# Patient Record
Sex: FEMALE | Race: WHITE | NOT HISPANIC OR LATINO | URBAN - METROPOLITAN AREA
[De-identification: names, ages, dates, MRNs, and addresses within clinical notes are randomized per-mention and may not be internally consistent; named-entity substitution may affect disease eponyms.]

---

## 2021-06-24 ENCOUNTER — OFFICE VISIT (OUTPATIENT)
Dept: URBAN - METROPOLITAN AREA CLINIC 72 | Facility: CLINIC | Age: 71
End: 2021-06-24
Payer: MEDICARE

## 2021-06-24 ENCOUNTER — WEB ENCOUNTER (OUTPATIENT)
Dept: URBAN - METROPOLITAN AREA CLINIC 72 | Facility: CLINIC | Age: 71
End: 2021-06-24

## 2021-06-24 VITALS
HEART RATE: 94 BPM | WEIGHT: 145.4 LBS | HEIGHT: 65 IN | DIASTOLIC BLOOD PRESSURE: 78 MMHG | SYSTOLIC BLOOD PRESSURE: 148 MMHG | RESPIRATION RATE: 18 BRPM | BODY MASS INDEX: 24.22 KG/M2 | TEMPERATURE: 98.4 F

## 2021-06-24 DIAGNOSIS — R10.13 DYSPEPSIA: ICD-10-CM

## 2021-06-24 DIAGNOSIS — K21.9 GASTROESOPHAGEAL REFLUX DISEASE, UNSPECIFIED WHETHER ESOPHAGITIS PRESENT: ICD-10-CM

## 2021-06-24 DIAGNOSIS — R14.0 ABDOMINAL BLOATING: ICD-10-CM

## 2021-06-24 PROCEDURE — 99204 OFFICE O/P NEW MOD 45 MIN: CPT | Performed by: INTERNAL MEDICINE

## 2021-06-24 RX ORDER — LAMOTRIGINE 150 MG/1
1 TABLET TABLET ORAL TWICE A DAY
Status: ACTIVE | COMMUNITY

## 2021-06-24 RX ORDER — DEXTROAMPHETAMINE SACCHARATE, AMPHETAMINE ASPARTATE, DEXTROAMPHETAMINE SULFATE, AND AMPHETAMINE SULFATE 2.5; 2.5; 2.5; 2.5 MG/1; MG/1; MG/1; MG/1
1 TABLET TABLET ORAL THREE TIMES A DAY
Status: ACTIVE | COMMUNITY

## 2021-06-24 RX ORDER — SERTRALINE HYDROCHLORIDE 100 MG/1
1 TABLET TABLET, FILM COATED ORAL ONCE A DAY
Status: ACTIVE | COMMUNITY

## 2021-06-24 NOTE — HPI-TODAY'S VISIT:
Mrs. Bauman is a 70-year-old female who presents as a new patient for evaluation of bloating and dyspepsia.   She sought care of another gastroenterologist for her symptoms.  Minimal intervention.  She reports that she feels a gurgling sensation when she swalllows.  This is associated with generalized dyspepsia and progressive bloating.  She denies any heartburn but does endorse a sour taste in her mouth.  She endorses weight gain as well.  She knows this can occur regardless of what she eats.  She has occasional nausea associated with it.  No dysphagia.  She has tried ranitidine in the past with minimal improvement.  She believes she has tried Nexium briefly.  Not had a recent upper endoscopy  Labwork available from hospital records from 3/3/2021 show a normal CBC

## 2021-08-02 ENCOUNTER — OFFICE VISIT (OUTPATIENT)
Dept: URBAN - METROPOLITAN AREA MEDICAL CENTER 40 | Facility: MEDICAL CENTER | Age: 71
End: 2021-08-02
Payer: MEDICARE

## 2021-08-02 DIAGNOSIS — R10.13 ABDOMINAL DISCOMFORT, EPIGASTRIC: ICD-10-CM

## 2021-08-02 DIAGNOSIS — K22.10 EROSIVE ESOPHAGITIS: ICD-10-CM

## 2021-08-02 DIAGNOSIS — K29.60 ADENOPAPILLOMATOSIS GASTRICA: ICD-10-CM

## 2021-08-02 PROCEDURE — 43239 EGD BIOPSY SINGLE/MULTIPLE: CPT | Performed by: INTERNAL MEDICINE

## 2021-08-06 ENCOUNTER — TELEPHONE ENCOUNTER (OUTPATIENT)
Dept: URBAN - METROPOLITAN AREA CLINIC 113 | Facility: CLINIC | Age: 71
End: 2021-08-06

## 2021-08-06 RX ORDER — LAMOTRIGINE 150 MG/1
1 TABLET TABLET ORAL TWICE A DAY
Status: ACTIVE | COMMUNITY

## 2021-08-06 RX ORDER — OMEPRAZOLE 40 MG/1
1 CAPSULE 30 MINUTES BEFORE MORNING MEAL CAPSULE, DELAYED RELEASE ORAL ONCE A DAY
Qty: 30 | OUTPATIENT
Start: 2021-08-06

## 2021-08-06 RX ORDER — DEXTROAMPHETAMINE SACCHARATE, AMPHETAMINE ASPARTATE, DEXTROAMPHETAMINE SULFATE, AND AMPHETAMINE SULFATE 2.5; 2.5; 2.5; 2.5 MG/1; MG/1; MG/1; MG/1
1 TABLET TABLET ORAL THREE TIMES A DAY
Status: ACTIVE | COMMUNITY

## 2021-08-06 RX ORDER — SERTRALINE HYDROCHLORIDE 100 MG/1
1 TABLET TABLET, FILM COATED ORAL ONCE A DAY
Status: ACTIVE | COMMUNITY

## 2021-08-20 ENCOUNTER — OFFICE VISIT (OUTPATIENT)
Dept: URBAN - METROPOLITAN AREA CLINIC 72 | Facility: CLINIC | Age: 71
End: 2021-08-20

## 2021-08-27 ENCOUNTER — OFFICE VISIT (OUTPATIENT)
Dept: URBAN - METROPOLITAN AREA CLINIC 72 | Facility: CLINIC | Age: 71
End: 2021-08-27

## 2021-09-09 ENCOUNTER — OFFICE VISIT (OUTPATIENT)
Dept: URBAN - METROPOLITAN AREA CLINIC 72 | Facility: CLINIC | Age: 71
End: 2021-09-09

## 2021-09-09 RX ORDER — DEXTROAMPHETAMINE SACCHARATE, AMPHETAMINE ASPARTATE, DEXTROAMPHETAMINE SULFATE, AND AMPHETAMINE SULFATE 2.5; 2.5; 2.5; 2.5 MG/1; MG/1; MG/1; MG/1
1 TABLET TABLET ORAL THREE TIMES A DAY
Status: ACTIVE | COMMUNITY

## 2021-09-09 RX ORDER — OMEPRAZOLE 40 MG/1
1 CAPSULE 30 MINUTES BEFORE MORNING MEAL CAPSULE, DELAYED RELEASE ORAL ONCE A DAY
Qty: 30 | Status: ACTIVE | COMMUNITY
Start: 2021-08-06

## 2021-09-09 RX ORDER — LAMOTRIGINE 150 MG/1
1 TABLET TABLET ORAL TWICE A DAY
Status: ACTIVE | COMMUNITY

## 2021-09-09 RX ORDER — SERTRALINE HYDROCHLORIDE 100 MG/1
1 TABLET TABLET, FILM COATED ORAL ONCE A DAY
Status: ACTIVE | COMMUNITY

## 2021-09-09 NOTE — HPI-TODAY'S VISIT:
Mrs. Bauman is a pleasant 70-year-old female who returns for follow-up.  She was last seen in our office on 6/24/2021 evaluation of bloating and dyspepsia.  Denies any true heartburn-like symptoms but did occasionally have sour taste in her mouth.  Tried ranitidine with minimal improvement had tried Nexium at some point.  Based on her symptoms we said to proceed with dietary modifications and an endoscopy.  Upper endoscopy performed 8/2/2021 significant for mild antral gastritis and irregular Z line at 38 cm from the incisors.  Biopsy showed inactive gastritis with intestinal metaplasia and chronic erosive esophagitis.

## 2021-09-30 ENCOUNTER — WEB ENCOUNTER (OUTPATIENT)
Dept: URBAN - METROPOLITAN AREA CLINIC 72 | Facility: CLINIC | Age: 71
End: 2021-09-30

## 2021-09-30 ENCOUNTER — OFFICE VISIT (OUTPATIENT)
Dept: URBAN - METROPOLITAN AREA CLINIC 72 | Facility: CLINIC | Age: 71
End: 2021-09-30
Payer: MEDICARE

## 2021-09-30 VITALS
TEMPERATURE: 98.8 F | SYSTOLIC BLOOD PRESSURE: 138 MMHG | WEIGHT: 142 LBS | DIASTOLIC BLOOD PRESSURE: 71 MMHG | HEART RATE: 88 BPM | BODY MASS INDEX: 23.66 KG/M2 | HEIGHT: 65 IN

## 2021-09-30 DIAGNOSIS — K21.9 GASTROESOPHAGEAL REFLUX DISEASE, UNSPECIFIED WHETHER ESOPHAGITIS PRESENT: ICD-10-CM

## 2021-09-30 DIAGNOSIS — R14.0 ABDOMINAL BLOATING: ICD-10-CM

## 2021-09-30 DIAGNOSIS — R10.13 DYSPEPSIA: ICD-10-CM

## 2021-09-30 DIAGNOSIS — K31.89 INTESTINAL METAPLASIA OF GASTRIC MUCOSA: ICD-10-CM

## 2021-09-30 PROCEDURE — 99214 OFFICE O/P EST MOD 30 MIN: CPT | Performed by: INTERNAL MEDICINE

## 2021-09-30 RX ORDER — OMEPRAZOLE 40 MG/1
1 CAPSULE 30 MINUTES BEFORE MORNING MEAL CAPSULE, DELAYED RELEASE ORAL ONCE A DAY
Qty: 30 | Status: ACTIVE | COMMUNITY
Start: 2021-08-06

## 2021-09-30 RX ORDER — DEXTROAMPHETAMINE SACCHARATE, AMPHETAMINE ASPARTATE, DEXTROAMPHETAMINE SULFATE, AND AMPHETAMINE SULFATE 2.5; 2.5; 2.5; 2.5 MG/1; MG/1; MG/1; MG/1
1 TABLET TABLET ORAL THREE TIMES A DAY
Status: ACTIVE | COMMUNITY

## 2021-09-30 RX ORDER — SERTRALINE HYDROCHLORIDE 100 MG/1
1 TABLET TABLET, FILM COATED ORAL ONCE A DAY
Status: ACTIVE | COMMUNITY

## 2021-09-30 RX ORDER — LAMOTRIGINE 150 MG/1
1 TABLET TABLET ORAL TWICE A DAY
Status: ACTIVE | COMMUNITY

## 2021-09-30 RX ORDER — FAMOTIDINE 40 MG/1
1 TABLET AT BEDTIME TABLET, FILM COATED ORAL ONCE A DAY
Qty: 90 | Refills: 1 | OUTPATIENT

## 2021-09-30 NOTE — HPI-TODAY'S VISIT:
Mrs. Bauman is a pleasant 70-year-old female who returns for follow-up.  She was last seen in our office on 6/24/2021 evaluation of bloating and dyspepsia.  Denies any true heartburn-like symptoms but did occasionally have sour taste in her mouth.  Tried ranitidine with minimal improvement had tried Nexium at some point.  Based on her symptoms we said to proceed with dietary modifications and an endoscopy.  Upper endoscopy performed 8/2/2021 significant for mild antral gastritis and irregular Z line at 38 cm from the incisors.  Biopsy showed inactive gastritis with intestinal metaplasia and chronic erosive esophagitis.She tried the omeprazole but felt like it gave her dry mouth therefore she discontinued it.  She is still having burning in her mid esophagus, no further dysphagia.  She is trying to make dietary adjustments to see if she can find what causes more of her symptoms.

## 2021-11-30 ENCOUNTER — OFFICE VISIT (OUTPATIENT)
Dept: URBAN - METROPOLITAN AREA CLINIC 72 | Facility: CLINIC | Age: 71
End: 2021-11-30
Payer: MEDICARE

## 2021-11-30 ENCOUNTER — WEB ENCOUNTER (OUTPATIENT)
Dept: URBAN - METROPOLITAN AREA CLINIC 72 | Facility: CLINIC | Age: 71
End: 2021-11-30

## 2021-11-30 VITALS
BODY MASS INDEX: 23.32 KG/M2 | DIASTOLIC BLOOD PRESSURE: 78 MMHG | SYSTOLIC BLOOD PRESSURE: 141 MMHG | HEART RATE: 95 BPM | RESPIRATION RATE: 18 BRPM | HEIGHT: 65 IN | TEMPERATURE: 98.2 F | WEIGHT: 140 LBS

## 2021-11-30 DIAGNOSIS — R14.0 ABDOMINAL BLOATING: ICD-10-CM

## 2021-11-30 DIAGNOSIS — K21.00 GASTROESOPHAGEAL REFLUX DISEASE WITH ESOPHAGITIS WITHOUT HEMORRHAGE: ICD-10-CM

## 2021-11-30 DIAGNOSIS — K31.89 INTESTINAL METAPLASIA OF GASTRIC MUCOSA: ICD-10-CM

## 2021-11-30 DIAGNOSIS — R10.13 DYSPEPSIA: ICD-10-CM

## 2021-11-30 PROCEDURE — 99214 OFFICE O/P EST MOD 30 MIN: CPT | Performed by: INTERNAL MEDICINE

## 2021-11-30 RX ORDER — OMEPRAZOLE 40 MG/1
1 CAPSULE 30 MINUTES BEFORE MORNING MEAL CAPSULE, DELAYED RELEASE ORAL ONCE A DAY
Qty: 30 | Status: DISCONTINUED | COMMUNITY
Start: 2021-08-06

## 2021-11-30 RX ORDER — DEXTROAMPHETAMINE SACCHARATE, AMPHETAMINE ASPARTATE, DEXTROAMPHETAMINE SULFATE, AND AMPHETAMINE SULFATE 2.5; 2.5; 2.5; 2.5 MG/1; MG/1; MG/1; MG/1
1 TABLET TABLET ORAL THREE TIMES A DAY
Status: ACTIVE | COMMUNITY

## 2021-11-30 RX ORDER — LAMOTRIGINE 150 MG/1
1 TABLET TABLET ORAL TWICE A DAY
Status: ACTIVE | COMMUNITY

## 2021-11-30 RX ORDER — FAMOTIDINE 40 MG/1
1 TABLET AT BEDTIME TABLET, FILM COATED ORAL ONCE A DAY
Qty: 90 | Refills: 1 | Status: DISCONTINUED | COMMUNITY

## 2021-11-30 RX ORDER — SERTRALINE HYDROCHLORIDE 100 MG/1
1 TABLET TABLET, FILM COATED ORAL ONCE A DAY
Status: DISCONTINUED | COMMUNITY

## 2021-11-30 NOTE — HPI-TODAY'S VISIT:
Mrs. Bauman is a pleasant 71-year-old female who returns for follow-up, she was last seen in our office on 9/30/2021.  She has history of bloating and dyspepsia and heartburn-like symptoms underwent an upper endoscopy in August 2021 that showed antral gastritis and irregular Z-line at 38 cm from the incisors.  Biopsy showed inactive gastritis with intestinal metaplasia and Z blind biopsy showed chronic erosive esophagitis.  She was placed on PPI therapy but discontinued it due to dry mouth sensation.  When we saw her she is working on dietary lifestyle changes.  We began Pepcid in place of PPI therapy.  She now returns with a complaint of nausea and vomiting.  She reports ongoing issues with nausea and vomiting. Is actually been seen in the urgent care with minimal improvement, was placed on antiemetic medication which helped briefly but only given a limited supply. She endorses bloating and a sensation of twisting in her stomach as well as a gurgling sensation. She has had a tubal ligation many years ago but no other abdominal surgeries. She has not had any cross-sectional imaging. She reports that stressors seem to worsen things but she notes that continue with generalized abdominal discomfort.

## 2021-12-01 ENCOUNTER — LAB OUTSIDE AN ENCOUNTER (OUTPATIENT)
Dept: URBAN - METROPOLITAN AREA CLINIC 72 | Facility: CLINIC | Age: 71
End: 2021-12-01

## 2021-12-09 ENCOUNTER — TELEPHONE ENCOUNTER (OUTPATIENT)
Dept: URBAN - METROPOLITAN AREA CLINIC 113 | Facility: CLINIC | Age: 71
End: 2021-12-09

## 2021-12-09 PROBLEM — 70342003: Status: ACTIVE | Noted: 2021-12-09

## 2021-12-15 ENCOUNTER — OFFICE VISIT (OUTPATIENT)
Dept: URBAN - METROPOLITAN AREA CLINIC 72 | Facility: CLINIC | Age: 71
End: 2021-12-15

## 2021-12-21 ENCOUNTER — TELEPHONE ENCOUNTER (OUTPATIENT)
Dept: URBAN - METROPOLITAN AREA CLINIC 72 | Facility: CLINIC | Age: 71
End: 2021-12-21

## 2021-12-21 RX ORDER — ONDANSETRON HYDROCHLORIDE 4 MG/1
1 TABLET TABLET, FILM COATED ORAL ONCE A DAY
Qty: 30 | OUTPATIENT
Start: 2021-12-22

## 2022-01-05 ENCOUNTER — OFFICE VISIT (OUTPATIENT)
Dept: URBAN - METROPOLITAN AREA CLINIC 72 | Facility: CLINIC | Age: 72
End: 2022-01-05
Payer: MEDICARE

## 2022-01-05 VITALS
RESPIRATION RATE: 18 BRPM | BODY MASS INDEX: 23.43 KG/M2 | HEART RATE: 97 BPM | WEIGHT: 140.6 LBS | HEIGHT: 65 IN | SYSTOLIC BLOOD PRESSURE: 137 MMHG | DIASTOLIC BLOOD PRESSURE: 72 MMHG | TEMPERATURE: 98 F

## 2022-01-05 DIAGNOSIS — R10.13 DYSPEPSIA: ICD-10-CM

## 2022-01-05 DIAGNOSIS — K80.20 CALCULUS OF GALLBLADDER WITHOUT CHOLECYSTITIS WITHOUT OBSTRUCTION: ICD-10-CM

## 2022-01-05 DIAGNOSIS — K22.10 EROSIVE ESOPHAGITIS: ICD-10-CM

## 2022-01-05 DIAGNOSIS — R14.0 ABDOMINAL BLOATING: ICD-10-CM

## 2022-01-05 DIAGNOSIS — K59.01 SLOW TRANSIT CONSTIPATION: ICD-10-CM

## 2022-01-05 DIAGNOSIS — K31.89 INTESTINAL METAPLASIA OF GASTRIC MUCOSA: ICD-10-CM

## 2022-01-05 PROBLEM — 40719004: Status: ACTIVE | Noted: 2022-01-05

## 2022-01-05 PROCEDURE — 99214 OFFICE O/P EST MOD 30 MIN: CPT | Performed by: INTERNAL MEDICINE

## 2022-01-05 RX ORDER — ONDANSETRON HYDROCHLORIDE 4 MG/1
1 TABLET TABLET, FILM COATED ORAL ONCE A DAY
Qty: 30 | Status: ACTIVE | COMMUNITY
Start: 2021-12-22

## 2022-01-05 RX ORDER — DEXTROAMPHETAMINE SACCHARATE, AMPHETAMINE ASPARTATE, DEXTROAMPHETAMINE SULFATE, AND AMPHETAMINE SULFATE 2.5; 2.5; 2.5; 2.5 MG/1; MG/1; MG/1; MG/1
1 TABLET TABLET ORAL THREE TIMES A DAY
Status: ACTIVE | COMMUNITY

## 2022-01-05 RX ORDER — LAMOTRIGINE 150 MG/1
1 TABLET TABLET ORAL TWICE A DAY
Status: ACTIVE | COMMUNITY

## 2022-01-05 NOTE — HPI-TODAY'S VISIT:
Mrs. Bauman is a pleasant 71-year-old female who returns for follow-up, she was last seen in our office on 11/30/2021.  She has a history of dyspepsia and bloating and heartburn-like symptoms, EGD performed in August 2021 showed antral gastritis and irregular Z-line at 38 cm from the incisors with biopsy showing intestinal metaplasia with inactive gastritis.  Chronic erosive esophagitis was found on esophageal biopsies.  She was placed on PPI therapy but ultimately discontinued medication secondary to dry mouth sensation.  She started Pepcid intermittently.  She is work on dietary lifestyle modifications. Her last saw her she complained of nausea vomiting, will see with an urgent care with minimal improvement reported a pulling and tugging sensation in her abdomen.  Given her symptoms a CT scan was performed on 12/8/2021 with IV contrast showing mild hepatic steatosis and possible gallstone sludge, gastritis/duodenitis and a small hiatal hernia, aortoiliac vascular calcifications and mild chronic constipation as well as L5-S1 spondylolithiasis.  Due to her right-sided pain we decided to proceed with ultrasound that showed possible sludge within the gallbladder otherwise unremarkable a mild increase echotexture of the liver suggestive of steatosis.

## 2022-05-05 ENCOUNTER — OFFICE VISIT (OUTPATIENT)
Dept: URBAN - METROPOLITAN AREA CLINIC 72 | Facility: CLINIC | Age: 72
End: 2022-05-05
Payer: MEDICARE

## 2022-05-05 VITALS
RESPIRATION RATE: 16 BRPM | DIASTOLIC BLOOD PRESSURE: 74 MMHG | WEIGHT: 137.6 LBS | TEMPERATURE: 98.5 F | SYSTOLIC BLOOD PRESSURE: 127 MMHG | HEART RATE: 86 BPM | BODY MASS INDEX: 22.92 KG/M2 | HEIGHT: 65 IN

## 2022-05-05 DIAGNOSIS — K80.20 CALCULUS OF GALLBLADDER WITHOUT CHOLECYSTITIS WITHOUT OBSTRUCTION: ICD-10-CM

## 2022-05-05 DIAGNOSIS — R10.13 DYSPEPSIA: ICD-10-CM

## 2022-05-05 DIAGNOSIS — K31.89 INTESTINAL METAPLASIA OF GASTRIC MUCOSA: ICD-10-CM

## 2022-05-05 DIAGNOSIS — K22.10 EROSIVE ESOPHAGITIS: ICD-10-CM

## 2022-05-05 DIAGNOSIS — K59.01 SLOW TRANSIT CONSTIPATION: ICD-10-CM

## 2022-05-05 DIAGNOSIS — R14.0 ABDOMINAL BLOATING: ICD-10-CM

## 2022-05-05 PROBLEM — 35298007: Status: ACTIVE | Noted: 2022-01-05

## 2022-05-05 PROBLEM — 162031009: Status: ACTIVE | Noted: 2021-12-01

## 2022-05-05 PROBLEM — 72519002: Status: ACTIVE | Noted: 2022-01-21

## 2022-05-05 PROBLEM — 116289008: Status: ACTIVE | Noted: 2021-12-01

## 2022-05-05 PROCEDURE — 99214 OFFICE O/P EST MOD 30 MIN: CPT | Performed by: INTERNAL MEDICINE

## 2022-05-05 RX ORDER — ARIPIPRAZOLE 5 MG/1
1 TABLET TABLET ORAL ONCE A DAY
Status: ACTIVE | COMMUNITY

## 2022-05-05 RX ORDER — ONDANSETRON HYDROCHLORIDE 4 MG/1
1 TABLET TABLET, FILM COATED ORAL ONCE A DAY
Qty: 30 | Status: DISCONTINUED | COMMUNITY
Start: 2021-12-22

## 2022-05-05 RX ORDER — LAMOTRIGINE 150 MG/1
1 TABLET TABLET ORAL TWICE A DAY
Status: ACTIVE | COMMUNITY

## 2022-05-05 RX ORDER — DEXTROAMPHETAMINE SACCHARATE, AMPHETAMINE ASPARTATE, DEXTROAMPHETAMINE SULFATE, AND AMPHETAMINE SULFATE 2.5; 2.5; 2.5; 2.5 MG/1; MG/1; MG/1; MG/1
1 TABLET TABLET ORAL THREE TIMES A DAY
Status: ACTIVE | COMMUNITY

## 2022-05-05 NOTE — HPI-TODAY'S VISIT:
Mrs. Bauman returns for follow-up.  Recall she is a 71-year-old female last seen in our office on 1/5/2022.  She has history of dyspepsia, bloating and heartburn-like symptoms.  EGD performed in August 2021 showed irregular Z-line and gastritis.  Erosive esophagitis was seen on esophageal biopsies and intestinal metaplasia with an active gastritis on gastric biopsies.  She was advised to start PPI but could not tolerate secondary to dry mouth thus she started Pepcid which was working intermittently for her.  We advised lifestyle modifications.  She did had a complaint of nausea and vomiting as well as a burning and pulling sensation in her abdomen, had a CT scan at the end of December with contrast showed mild hepatic steatosis and biliary sludge with a small hiatal hernia and mild chronic constipation.  She had ongoing right-sided pain thus an ultrasound performed that showed sludge within the gallbladder and steatosis.  We advised lifestyle modifications, repeat EGD in 2 years for gastric mapping, high-fiber diet and exercise and lifestyle modifications for her erosive esophagitis.  In addition we referred her to surgery to evaluate her for possible antireflux and biliary concerns.  She ultimately had HIDA that showed 10% ejection fraction of the gallbladder and she underwent laparoscopic cholecystectomy.   She had her gallbladder out and has been doing remarkably well.  She no longer is having any issues.  She does take acid suppression medication intermittently, notes that it has helped her as well but would like to try to come off that if possible. Like she has no complaints today. no

## 2023-10-24 ENCOUNTER — TELEPHONE ENCOUNTER (OUTPATIENT)
Dept: URBAN - METROPOLITAN AREA CLINIC 72 | Facility: CLINIC | Age: 73
End: 2023-10-24

## 2023-10-30 ENCOUNTER — OFFICE VISIT (OUTPATIENT)
Dept: URBAN - METROPOLITAN AREA CLINIC 72 | Facility: CLINIC | Age: 73
End: 2023-10-30
Payer: MEDICARE

## 2023-10-30 VITALS
TEMPERATURE: 97.1 F | BODY MASS INDEX: 23.79 KG/M2 | HEIGHT: 65 IN | WEIGHT: 142.8 LBS | DIASTOLIC BLOOD PRESSURE: 57 MMHG | SYSTOLIC BLOOD PRESSURE: 109 MMHG | HEART RATE: 96 BPM

## 2023-10-30 DIAGNOSIS — R11.0 NAUSEA: ICD-10-CM

## 2023-10-30 DIAGNOSIS — K31.89 INTESTINAL METAPLASIA OF GASTRIC MUCOSA: ICD-10-CM

## 2023-10-30 DIAGNOSIS — R19.7 ACUTE DIARRHEA: ICD-10-CM

## 2023-10-30 PROCEDURE — 99214 OFFICE O/P EST MOD 30 MIN: CPT | Performed by: NURSE PRACTITIONER

## 2023-10-30 RX ORDER — DEXTROAMPHETAMINE SACCHARATE, AMPHETAMINE ASPARTATE, DEXTROAMPHETAMINE SULFATE, AND AMPHETAMINE SULFATE 2.5; 2.5; 2.5; 2.5 MG/1; MG/1; MG/1; MG/1
1 TABLET TABLET ORAL THREE TIMES A DAY
Status: ACTIVE | COMMUNITY

## 2023-10-30 RX ORDER — LAMOTRIGINE 150 MG/1
1 TABLET TABLET ORAL TWICE A DAY
Status: ACTIVE | COMMUNITY

## 2023-10-30 RX ORDER — CHOLESTYRAMINE 4 G/9G
1 SCOOP POWDER, FOR SUSPENSION ORAL ONCE A DAY
Qty: 120 GRAM | Refills: 1 | OUTPATIENT
Start: 2023-10-30

## 2023-10-30 RX ORDER — VALSARTAN 160 MG/1
TAKE ONE TABLET BY MOUTH ONE TIME DAILY TABLET ORAL
Qty: 90 UNSPECIFIED | Refills: 2 | Status: ACTIVE | COMMUNITY

## 2023-10-30 RX ORDER — ARIPIPRAZOLE 5 MG/1
1 TABLET TABLET ORAL ONCE A DAY
Status: ON HOLD | COMMUNITY

## 2023-10-30 RX ORDER — DICYCLOMINE HYDROCHLORIDE 20 MG/1
1 TABLET TABLET ORAL THREE TIMES A DAY
Qty: 90 | Refills: 1 | OUTPATIENT
Start: 2023-10-30 | End: 2023-12-28

## 2023-10-30 NOTE — HPI-OTHER HISTORIES
Labs: -10/23/2023.  CBC: WBC 11.2, Hgb 10.8, HCT 32.4, RBC 3.38.  CMP: GFR 56 otherwise normal.  UA did not show any significant findings for UTI.  Urine culture showed mixed chris-specimen collection suggested.  Procedures: -EGD performed in August 2021 showed irregular Z-line and gastritis. Erosive esophagitis was seen on esophageal biopsies and intestinal metaplasia with an active gastritis on gastric biopsies. She was advised to start PPI but could not tolerate secondary to dry mouth thus she started Pepcid which was working intermittently for her. We advised lifestyle modifications.   She does not recall when her last colonoscopy was. She thought Dr. Landin performed her last one but I do not have any record of colonoscopy in her system.   Imaging: -CT scan at the end of December 2021 with contrast showed mild hepatic steatosis and biliary sludge with a small hiatal hernia and mild chronic constipation.  -Ultrasound December 2021 performed that showed sludge within the gallbladder and steatosis.-HIDA 2/2022 that showed 10% ejection fraction of the gallbladder

## 2023-10-30 NOTE — HPI-TODAY'S VISIT:
73-year-old female heref for diarrhea and ER follow-up.    Was in the ER at [facility] 10/24/2023 for dehydration with diarrhea for the past few days.  She was provided with IV fluids and discharged home.  Last seen 5/5/2022 for 4-month follow-up for erosive esophagitis, intestinal metaplasia, dyspepsia, abdominal bloating, history of constipation.Due for repeat EGD 2023.  Today she reports diarrhea for 10 days.  Reports recent travel within the [place], but no sick contacts and her spouse ate the same foods and is fine.  Stools are brown liquid.  6 times a day.  Has fecal leakage.  Has abdominal cramping.  Prior to this, one formed BM daily.  No melena, hematochezia, mucous or oily stools.  Weight is up 5 pounds since her last appointment. No fever or chills.

## 2023-11-03 LAB
C DIFFICILE TOXINS A+B, EIA: POSITIVE
CALPROTECTIN, FECAL: 545
CAMPYLOBACTER CULTURE: (no result)
E COLI SHIGA TOXIN EIA: NEGATIVE
GIARDIA LAMBLIA AG, EIA: NEGATIVE
OVA + PARASITE EXAM: (no result)
SALMONELLA/SHIGELLA SCREEN: (no result)
WHITE BLOOD CELLS (WBC), STOOL: (no result)

## 2023-11-05 ENCOUNTER — TELEPHONE ENCOUNTER (OUTPATIENT)
Dept: URBAN - METROPOLITAN AREA CLINIC 72 | Facility: CLINIC | Age: 73
End: 2023-11-05

## 2023-11-05 RX ORDER — SACCHAROMYCES BOULARDII 50 MG
TWICE DAILY CAPSULE ORAL
Qty: 60 | Refills: 1 | OUTPATIENT
Start: 2023-11-07 | End: 2024-01-06

## 2023-11-06 ENCOUNTER — TELEPHONE ENCOUNTER (OUTPATIENT)
Dept: URBAN - METROPOLITAN AREA CLINIC 72 | Facility: CLINIC | Age: 73
End: 2023-11-06

## 2023-11-06 ENCOUNTER — TELEPHONE ENCOUNTER (OUTPATIENT)
Dept: URBAN - METROPOLITAN AREA CLINIC 113 | Facility: CLINIC | Age: 73
End: 2023-11-06

## 2023-11-06 RX ORDER — VANCOMYCIN HYDROCHLORIDE 125 MG/1
1 CAPSULES CAPSULE ORAL
Qty: 40 CAPSULE | Refills: 0 | OUTPATIENT
Start: 2023-11-06 | End: 2023-11-16

## 2023-11-07 ENCOUNTER — TELEPHONE ENCOUNTER (OUTPATIENT)
Dept: URBAN - METROPOLITAN AREA CLINIC 113 | Facility: CLINIC | Age: 73
End: 2023-11-07

## 2023-11-07 RX ORDER — VANCOMYCIN HYDROCHLORIDE 125 MG/1
1 CAPSULES CAPSULE ORAL
Qty: 40 CAPSULE | Refills: 0
Start: 2023-11-06 | End: 2023-11-17

## 2023-11-09 ENCOUNTER — TELEPHONE ENCOUNTER (OUTPATIENT)
Dept: URBAN - METROPOLITAN AREA CLINIC 72 | Facility: CLINIC | Age: 73
End: 2023-11-09

## 2023-11-14 ENCOUNTER — OFFICE VISIT (OUTPATIENT)
Dept: URBAN - METROPOLITAN AREA CLINIC 72 | Facility: CLINIC | Age: 73
End: 2023-11-14
Payer: MEDICARE

## 2023-11-14 ENCOUNTER — LAB OUTSIDE AN ENCOUNTER (OUTPATIENT)
Dept: URBAN - METROPOLITAN AREA CLINIC 72 | Facility: CLINIC | Age: 73
End: 2023-11-14

## 2023-11-14 VITALS
HEIGHT: 65 IN | HEART RATE: 101 BPM | SYSTOLIC BLOOD PRESSURE: 120 MMHG | TEMPERATURE: 97.1 F | DIASTOLIC BLOOD PRESSURE: 62 MMHG | WEIGHT: 139 LBS | BODY MASS INDEX: 23.16 KG/M2

## 2023-11-14 DIAGNOSIS — K31.89 INTESTINAL METAPLASIA OF GASTRIC MUCOSA: ICD-10-CM

## 2023-11-14 DIAGNOSIS — A04.72 CLOSTRIDIUM DIFFICILE DIARRHEA: ICD-10-CM

## 2023-11-14 PROCEDURE — 99214 OFFICE O/P EST MOD 30 MIN: CPT | Performed by: NURSE PRACTITIONER

## 2023-11-14 RX ORDER — VALSARTAN 160 MG/1
TAKE ONE TABLET BY MOUTH ONE TIME DAILY TABLET ORAL
Qty: 90 UNSPECIFIED | Refills: 2 | Status: ACTIVE | COMMUNITY

## 2023-11-14 RX ORDER — ARIPIPRAZOLE 5 MG/1
1 TABLET TABLET ORAL ONCE A DAY
Status: ON HOLD | COMMUNITY

## 2023-11-14 RX ORDER — CHOLESTYRAMINE 4 G/9G
1 SCOOP POWDER, FOR SUSPENSION ORAL ONCE A DAY
Qty: 120 GRAM | Refills: 1 | Status: ACTIVE | COMMUNITY
Start: 2023-10-30

## 2023-11-14 RX ORDER — DICYCLOMINE HYDROCHLORIDE 20 MG/1
1 TABLET TABLET ORAL THREE TIMES A DAY
Qty: 90 | Refills: 1 | Status: ACTIVE | COMMUNITY
Start: 2023-10-30 | End: 2023-12-28

## 2023-11-14 RX ORDER — SACCHAROMYCES BOULARDII 50 MG
TWICE DAILY CAPSULE ORAL
Qty: 60 | Refills: 1 | Status: ON HOLD | COMMUNITY
Start: 2023-11-07 | End: 2024-01-06

## 2023-11-14 RX ORDER — VANCOMYCIN HYDROCHLORIDE 125 MG/1
1 CAPSULES CAPSULE ORAL
Qty: 40 CAPSULE | Refills: 0 | Status: ACTIVE | COMMUNITY
Start: 2023-11-06 | End: 2023-11-17

## 2023-11-14 RX ORDER — DEXTROAMPHETAMINE SACCHARATE, AMPHETAMINE ASPARTATE, DEXTROAMPHETAMINE SULFATE, AND AMPHETAMINE SULFATE 2.5; 2.5; 2.5; 2.5 MG/1; MG/1; MG/1; MG/1
1 TABLET TABLET ORAL THREE TIMES A DAY
Status: ACTIVE | COMMUNITY

## 2023-11-14 RX ORDER — LAMOTRIGINE 150 MG/1
1 TABLET TABLET ORAL TWICE A DAY
Status: ACTIVE | COMMUNITY

## 2023-11-14 NOTE — HPI-TODAY'S VISIT:
73-year-old female here for a 2 week follow-up.    Last seen 10/30/2023 for acute diarrhea, nausea and ER follow at [facility] 10/24/2023 for dehydration with diarrhea for which she was provided with IV fluids and discharged home.  At her last office visit, stool studies ordered started on cholestyramine and dicyclomine.  Due for surveillance EGD-to be ordered at next visit. Zofran prescribecd for nausea.  Her stool studies showed C. difficile was prescribed Dificid but was too expensive was switched to vancomycin.  Currently has 1 formed BM a day. No abdominal pain, melena, hematochezia or mucous.  Is taking the Vancomcin only 3 times a day. Going to [facility] next week for anniversary trip.  She is on the cholestyramine.  Weight is down 4 pounds from her last appointment.

## 2023-11-14 NOTE — HPI-OTHER HISTORIES
Labs: -10/23/2023.  CBC: WBC 11.2, Hgb 10.8, HCT 32.4, RBC 3.38.  CMP: GFR 56 otherwise normal.  UA did not show any significant findings for UTI.  Urine culture showed mixed chris-specimen collection suggested. -Stool studies 11/3/2023.  C. difficile positive.  Ova and parasite negative.  Giardia negative.  Fecal calprotectin elevated 545.  Stool culture negative.  WBC negative.  Procedures: -EGD performed in August 2021 showed irregular Z-line and gastritis. Erosive esophagitis was seen on esophageal biopsies and intestinal metaplasia with an active gastritis on gastric biopsies. She was advised to start PPI but could not tolerate secondary to dry mouth thus she started Pepcid which was working intermittently for her. We advised lifestyle modifications.   She does not recall when her last colonoscopy was. She thought Dr. Landin performed her last one but I do not have any record of colonoscopy in her system.   Imaging: -CT scan at the end of December 2021 with contrast showed mild hepatic steatosis and biliary sludge with a small hiatal hernia and mild chronic constipation.  -Ultrasound December 2021 performed that showed sludge within the gallbladder and steatosis. -HIDA 2/2022 that showed 10% ejection fraction of the gallbladder

## 2024-01-16 ENCOUNTER — OFFICE VISIT (OUTPATIENT)
Dept: URBAN - METROPOLITAN AREA CLINIC 72 | Facility: CLINIC | Age: 74
End: 2024-01-16
Payer: MEDICARE

## 2024-01-16 ENCOUNTER — LAB OUTSIDE AN ENCOUNTER (OUTPATIENT)
Dept: URBAN - METROPOLITAN AREA CLINIC 72 | Facility: CLINIC | Age: 74
End: 2024-01-16

## 2024-01-16 VITALS
HEIGHT: 65 IN | HEART RATE: 103 BPM | SYSTOLIC BLOOD PRESSURE: 117 MMHG | TEMPERATURE: 97.1 F | BODY MASS INDEX: 23.49 KG/M2 | DIASTOLIC BLOOD PRESSURE: 68 MMHG | WEIGHT: 141 LBS

## 2024-01-16 DIAGNOSIS — A04.72 CLOSTRIDIUM DIFFICILE DIARRHEA: ICD-10-CM

## 2024-01-16 DIAGNOSIS — K31.89 INTESTINAL METAPLASIA OF GASTRIC MUCOSA: ICD-10-CM

## 2024-01-16 PROCEDURE — 99214 OFFICE O/P EST MOD 30 MIN: CPT | Performed by: NURSE PRACTITIONER

## 2024-01-16 RX ORDER — SERTRALINE HYDROCHLORIDE 100 MG/1
TABLET, FILM COATED ORAL
Qty: 30 TABLET | Status: ACTIVE | COMMUNITY

## 2024-01-16 RX ORDER — VANCOMYCIN HYDROCHLORIDE 125 MG/1
TAKE ONE CAPSULE BY MOUTH FOUR TIMES A DAY FOR 14 DAYS, THEN TAKE ONE CAPSULE BY MOUTH TWICE A DAY FOR 7 DAYS, THEN TAKE ONE CAPSULE BY MOUT CAPSULE ORAL
Qty: 80 UNSPECIFIED | Refills: 0 | Status: ON HOLD | COMMUNITY

## 2024-01-16 RX ORDER — ARIPIPRAZOLE 5 MG/1
1 TABLET TABLET ORAL ONCE A DAY
Status: ON HOLD | COMMUNITY

## 2024-01-16 RX ORDER — LAMOTRIGINE 150 MG/1
1 TABLET TABLET ORAL TWICE A DAY
Status: ACTIVE | COMMUNITY

## 2024-01-16 RX ORDER — DEXTROAMPHETAMINE SACCHARATE, AMPHETAMINE ASPARTATE, DEXTROAMPHETAMINE SULFATE, AND AMPHETAMINE SULFATE 2.5; 2.5; 2.5; 2.5 MG/1; MG/1; MG/1; MG/1
1 TABLET TABLET ORAL THREE TIMES A DAY
Status: ACTIVE | COMMUNITY

## 2024-01-16 RX ORDER — VALSARTAN 160 MG/1
TAKE ONE TABLET BY MOUTH ONE TIME DAILY TABLET ORAL
Qty: 90 UNSPECIFIED | Refills: 2 | Status: ACTIVE | COMMUNITY

## 2024-01-16 NOTE — HPI-TODAY'S VISIT:
73-year-old female here for a 2 month follow-up.    Last seen 11/14/2023 for follow-up for C. difficile.  Diarrhea had resolved.  Was advised to complete full course of vancomycin.  For history of intestinal metaplasia she was due for surveillance EGD.  Scheduled for 2/22/2024.  Her stool studies showed C. difficile was prescribed Dificid but was too expensive was switched to vancomycin.  She reports bowels are still not back to normal.  She has 2 BM's in the morning, has brown stool that is soft.  In November she saw her PCP and received another round of vancomycin that is tapered and she is still on it.  Was then sent to Dr. Ortiz for a flex sig and was told it was ok.  She reports increased gas, no melena or hematochezia. No abdominal pain.  Weight is stable from last appointment.  She is going to start an antibiotic for UTI on Macrobid. She is on a probiotic. She is not on any antacids currently.  Has been on cholestyramine in the past but is not taking it currently.

## 2024-02-22 ENCOUNTER — EGD (OUTPATIENT)
Dept: URBAN - METROPOLITAN AREA MEDICAL CENTER 40 | Facility: MEDICAL CENTER | Age: 74
End: 2024-02-22

## 2024-04-15 ENCOUNTER — OV EP (OUTPATIENT)
Dept: URBAN - METROPOLITAN AREA CLINIC 72 | Facility: CLINIC | Age: 74
End: 2024-04-15
Payer: MEDICARE

## 2024-04-15 VITALS
HEIGHT: 65 IN | SYSTOLIC BLOOD PRESSURE: 110 MMHG | TEMPERATURE: 97.3 F | WEIGHT: 144.6 LBS | HEART RATE: 99 BPM | BODY MASS INDEX: 24.09 KG/M2 | DIASTOLIC BLOOD PRESSURE: 63 MMHG

## 2024-04-15 DIAGNOSIS — A04.72 CLOSTRIDIUM DIFFICILE DIARRHEA: ICD-10-CM

## 2024-04-15 DIAGNOSIS — R10.13 DYSPEPSIA: ICD-10-CM

## 2024-04-15 DIAGNOSIS — K31.89 INTESTINAL METAPLASIA OF GASTRIC MUCOSA: ICD-10-CM

## 2024-04-15 PROCEDURE — 99214 OFFICE O/P EST MOD 30 MIN: CPT | Performed by: NURSE PRACTITIONER

## 2024-04-15 RX ORDER — SERTRALINE HYDROCHLORIDE 100 MG/1
TABLET, FILM COATED ORAL
Qty: 30 TABLET | Status: ACTIVE | COMMUNITY

## 2024-04-15 RX ORDER — VANCOMYCIN HYDROCHLORIDE 125 MG/1
TAKE ONE CAPSULE BY MOUTH FOUR TIMES A DAY FOR 14 DAYS, THEN TAKE ONE CAPSULE BY MOUTH TWICE A DAY FOR 7 DAYS, THEN TAKE ONE CAPSULE BY MOUT CAPSULE ORAL
Qty: 80 UNSPECIFIED | Refills: 0 | Status: ON HOLD | COMMUNITY

## 2024-04-15 RX ORDER — SODIUM SULFATE, MAGNESIUM SULFATE, AND POTASSIUM CHLORIDE 17.75; 2.7; 2.25 G/1; G/1; G/1
AS DIRECTED TABLET ORAL
Qty: 1 KIT | Refills: 0 | OUTPATIENT
Start: 2024-04-15

## 2024-04-15 RX ORDER — ARIPIPRAZOLE 5 MG/1
1 TABLET TABLET ORAL ONCE A DAY
Status: ON HOLD | COMMUNITY

## 2024-04-15 RX ORDER — LAMOTRIGINE 150 MG/1
1 TABLET TABLET ORAL TWICE A DAY
Status: ACTIVE | COMMUNITY

## 2024-04-15 RX ORDER — VALSARTAN 160 MG/1
TAKE ONE TABLET BY MOUTH ONE TIME DAILY TABLET ORAL
Qty: 90 UNSPECIFIED | Refills: 2 | Status: ACTIVE | COMMUNITY

## 2024-04-15 RX ORDER — DEXTROAMPHETAMINE SACCHARATE, AMPHETAMINE ASPARTATE, DEXTROAMPHETAMINE SULFATE, AND AMPHETAMINE SULFATE 2.5; 2.5; 2.5; 2.5 MG/1; MG/1; MG/1; MG/1
1 TABLET TABLET ORAL THREE TIMES A DAY
Status: ACTIVE | COMMUNITY

## 2024-04-15 NOTE — HPI-TODAY'S VISIT:
73-year-old female here for a 2-month follow-up.    Last seen 1/16/2024 for altered bowel habits with history of C. difficile and gastric intestinal metaplasia. She was advised to complete the full course of vancomycin and continue probiotic after treatment. For her intestinal metaplasia recommend upper endoscopy for further evaluation. She would like a colonoscopy for her altered bowel habits. Due to her recent C. difficile I do not want to schedule the procedure right away and did postpone it until June. Advise Benefiber with meals resume cholestyramine if stools become loose and notify me if persistent diarrhea occurs. Records were requested. EGD/colonoscopy scheduled for 6/17/2024  Today her bowels have improved, but she has a queasy stomach. She attributes it to nerves about going out of town for a trip she is not looking forward to.  Bowel movements are regular, no diarrhea. No melena or hematochezia.   Weight is stable from her last appointment. She is not on any antacids currently and is not interested in them states they don't work for her.  Has been on cholestyramine in the past but is not taking it currently.

## 2024-04-15 NOTE — HPI-OTHER HISTORIES
Labs: -10/23/2023.  CBC: WBC 11.2, Hgb 10.8, HCT 32.4, RBC 3.38.  CMP: GFR 56 otherwise normal.  UA did not show any significant findings for UTI.  Urine culture showed mixed chris-specimen collection suggested. -Stool studies 11/3/2023.  C. difficile positive.  Ova and parasite negative.  Giardia negative.  Fecal calprotectin elevated 545.  Stool culture negative.  WBC negative.  Procedures: -EGD performed in August 2021 showed irregular Z-line and gastritis. Erosive esophagitis was seen on esophageal biopsies and intestinal metaplasia with an active gastritis on gastric biopsies. She was advised to start PPI but could not tolerate secondary to dry mouth thus she started Pepcid which was working intermittently for her. We advised lifestyle modifications. -Flex sig 12/12/2023 was normal. Random colon biopsy normal.   Imaging: -CT scan at the end of December 2021 with contrast showed mild hepatic steatosis and biliary sludge with a small hiatal hernia and mild chronic constipation.  -Ultrasound December 2021 performed that showed sludge within the gallbladder and steatosis. -HIDA 2/2022 that showed 10% ejection fraction of the gallbladder

## 2024-05-20 ENCOUNTER — TELEPHONE ENCOUNTER (OUTPATIENT)
Dept: URBAN - METROPOLITAN AREA CLINIC 72 | Facility: CLINIC | Age: 74
End: 2024-05-20

## 2024-05-21 ENCOUNTER — LAB OUTSIDE AN ENCOUNTER (OUTPATIENT)
Dept: URBAN - METROPOLITAN AREA CLINIC 72 | Facility: CLINIC | Age: 74
End: 2024-05-21

## 2024-06-17 ENCOUNTER — OFFICE VISIT (OUTPATIENT)
Dept: URBAN - METROPOLITAN AREA MEDICAL CENTER 40 | Facility: MEDICAL CENTER | Age: 74
End: 2024-06-17

## 2024-06-17 ENCOUNTER — TELEPHONE ENCOUNTER (OUTPATIENT)
Dept: URBAN - METROPOLITAN AREA CLINIC 113 | Facility: CLINIC | Age: 74
End: 2024-06-17

## 2024-06-17 RX ORDER — LAMOTRIGINE 150 MG/1
1 TABLET TABLET ORAL TWICE A DAY
Status: ACTIVE | COMMUNITY

## 2024-06-17 RX ORDER — SERTRALINE HYDROCHLORIDE 100 MG/1
TABLET, FILM COATED ORAL
Qty: 30 TABLET | Status: ACTIVE | COMMUNITY

## 2024-06-17 RX ORDER — VALSARTAN 160 MG/1
TAKE ONE TABLET BY MOUTH ONE TIME DAILY TABLET ORAL
Qty: 90 UNSPECIFIED | Refills: 2 | Status: ACTIVE | COMMUNITY

## 2024-06-17 RX ORDER — ARIPIPRAZOLE 5 MG/1
1 TABLET TABLET ORAL ONCE A DAY
Status: ON HOLD | COMMUNITY

## 2024-06-17 RX ORDER — DEXTROAMPHETAMINE SACCHARATE, AMPHETAMINE ASPARTATE, DEXTROAMPHETAMINE SULFATE, AND AMPHETAMINE SULFATE 2.5; 2.5; 2.5; 2.5 MG/1; MG/1; MG/1; MG/1
1 TABLET TABLET ORAL THREE TIMES A DAY
Status: ACTIVE | COMMUNITY

## 2024-06-17 RX ORDER — VANCOMYCIN HYDROCHLORIDE 125 MG/1
TAKE ONE CAPSULE BY MOUTH FOUR TIMES A DAY FOR 14 DAYS, THEN TAKE ONE CAPSULE BY MOUTH TWICE A DAY FOR 7 DAYS, THEN TAKE ONE CAPSULE BY MOUT CAPSULE ORAL
Qty: 80 UNSPECIFIED | Refills: 0 | Status: ON HOLD | COMMUNITY

## 2024-06-17 RX ORDER — SODIUM SULFATE, MAGNESIUM SULFATE, AND POTASSIUM CHLORIDE 17.75; 2.7; 2.25 G/1; G/1; G/1
AS DIRECTED TABLET ORAL
Qty: 1 KIT | Refills: 0 | Status: ACTIVE | COMMUNITY
Start: 2024-04-15

## 2024-06-18 ENCOUNTER — LAB OUTSIDE AN ENCOUNTER (OUTPATIENT)
Dept: URBAN - METROPOLITAN AREA CLINIC 113 | Facility: CLINIC | Age: 74
End: 2024-06-18

## 2024-06-18 PROBLEM — 87522002: Status: ACTIVE | Noted: 2024-06-18

## 2024-06-26 ENCOUNTER — OFFICE VISIT (OUTPATIENT)
Dept: URBAN - METROPOLITAN AREA CLINIC 72 | Facility: CLINIC | Age: 74
End: 2024-06-26
Payer: MEDICARE

## 2024-06-26 ENCOUNTER — DASHBOARD ENCOUNTERS (OUTPATIENT)
Age: 74
End: 2024-06-26

## 2024-06-26 VITALS
HEIGHT: 65 IN | TEMPERATURE: 97.7 F | DIASTOLIC BLOOD PRESSURE: 80 MMHG | BODY MASS INDEX: 23.86 KG/M2 | WEIGHT: 143.2 LBS | HEART RATE: 105 BPM | SYSTOLIC BLOOD PRESSURE: 149 MMHG

## 2024-06-26 DIAGNOSIS — R10.13 DYSPEPSIA: ICD-10-CM

## 2024-06-26 DIAGNOSIS — R14.0 ABDOMINAL BLOATING: ICD-10-CM

## 2024-06-26 DIAGNOSIS — K31.89 INTESTINAL METAPLASIA OF GASTRIC MUCOSA: ICD-10-CM

## 2024-06-26 PROCEDURE — 99213 OFFICE O/P EST LOW 20 MIN: CPT | Performed by: INTERNAL MEDICINE

## 2024-06-26 RX ORDER — LAMOTRIGINE 150 MG/1
1 TABLET TABLET ORAL TWICE A DAY
Status: ACTIVE | COMMUNITY

## 2024-06-26 RX ORDER — VANCOMYCIN HYDROCHLORIDE 125 MG/1
TAKE ONE CAPSULE BY MOUTH FOUR TIMES A DAY FOR 14 DAYS, THEN TAKE ONE CAPSULE BY MOUTH TWICE A DAY FOR 7 DAYS, THEN TAKE ONE CAPSULE BY MOUT CAPSULE ORAL
Qty: 80 UNSPECIFIED | Refills: 0 | Status: ON HOLD | COMMUNITY

## 2024-06-26 RX ORDER — SERTRALINE HYDROCHLORIDE 100 MG/1
TABLET, FILM COATED ORAL
Qty: 30 TABLET | Status: ACTIVE | COMMUNITY

## 2024-06-26 RX ORDER — SODIUM SULFATE, MAGNESIUM SULFATE, AND POTASSIUM CHLORIDE 17.75; 2.7; 2.25 G/1; G/1; G/1
AS DIRECTED TABLET ORAL
Qty: 1 KIT | Refills: 0 | Status: ON HOLD | COMMUNITY
Start: 2024-04-15

## 2024-06-26 RX ORDER — DEXTROAMPHETAMINE SACCHARATE, AMPHETAMINE ASPARTATE, DEXTROAMPHETAMINE SULFATE, AND AMPHETAMINE SULFATE 2.5; 2.5; 2.5; 2.5 MG/1; MG/1; MG/1; MG/1
1 TABLET TABLET ORAL THREE TIMES A DAY
Status: ACTIVE | COMMUNITY

## 2024-06-26 RX ORDER — ARIPIPRAZOLE 5 MG/1
1 TABLET TABLET ORAL ONCE A DAY
Status: ON HOLD | COMMUNITY

## 2024-06-26 RX ORDER — VALSARTAN 160 MG/1
TAKE ONE TABLET BY MOUTH ONE TIME DAILY TABLET ORAL
Qty: 90 UNSPECIFIED | Refills: 2 | Status: ACTIVE | COMMUNITY

## 2024-06-26 NOTE — EXAM-PHYSICAL EXAM
General--no acute distress, resting comfortably Eyes--anicteric, no pallor HENT--normocephalic, atraumatic head Neck--no lymphadenopathy, non tender Chest--non labored breathing, equal rise Abdomen--soft, non tender, non distended, no organomegaly Ext: SELWYN, no obvious sores or rashes

## 2024-06-26 NOTE — HPI-TODAY'S VISIT:
Mrs. Bauman returns for follow-up.  Recall she is a 73-year-old last seen in office on 1/16/2024.  She has a history of C. difficile colitis in 2023 which was treated.  In addition she has gastric intestinal metaplasia and anemia.  She underwent an EGD for anemia and dyspepsia on 6/18/2024.  She had retained food in the stomach which limited meaningful evaluation.  Lab work from PCP 6/7/2024 showed WBC of 5.4, hemoglobin 11.9, hematocrit 34.9, MCV 98, normal CMP with the exception of an ALT of 33  She was concerned about the retained food in her stomach that she may have gastroparesis, she reports that she actually feels hungry and does not have issues with feeling full, she denies any regurgitation of food or constipation.

## 2024-07-05 ENCOUNTER — OFFICE VISIT (OUTPATIENT)
Dept: URBAN - METROPOLITAN AREA CLINIC 72 | Facility: CLINIC | Age: 74
End: 2024-07-05

## 2024-07-08 ENCOUNTER — OFFICE VISIT (OUTPATIENT)
Dept: URBAN - METROPOLITAN AREA CLINIC 72 | Facility: CLINIC | Age: 74
End: 2024-07-08

## 2024-07-09 ENCOUNTER — OFFICE VISIT (OUTPATIENT)
Dept: URBAN - METROPOLITAN AREA CLINIC 72 | Facility: CLINIC | Age: 74
End: 2024-07-09

## 2024-08-08 ENCOUNTER — OFFICE VISIT (OUTPATIENT)
Dept: URBAN - METROPOLITAN AREA CLINIC 72 | Facility: CLINIC | Age: 74
End: 2024-08-08
Payer: MEDICARE

## 2024-08-08 VITALS
SYSTOLIC BLOOD PRESSURE: 113 MMHG | WEIGHT: 142 LBS | HEART RATE: 89 BPM | DIASTOLIC BLOOD PRESSURE: 64 MMHG | TEMPERATURE: 97.7 F | HEIGHT: 65 IN | BODY MASS INDEX: 23.66 KG/M2

## 2024-08-08 DIAGNOSIS — R19.7 ACUTE DIARRHEA: ICD-10-CM

## 2024-08-08 PROBLEM — 126081000119101: Status: ACTIVE | Noted: 2024-08-08

## 2024-08-08 PROBLEM — 409966000: Status: ACTIVE | Noted: 2024-08-08

## 2024-08-08 PROCEDURE — 99213 OFFICE O/P EST LOW 20 MIN: CPT

## 2024-08-08 RX ORDER — SODIUM SULFATE, MAGNESIUM SULFATE, AND POTASSIUM CHLORIDE 17.75; 2.7; 2.25 G/1; G/1; G/1
AS DIRECTED TABLET ORAL
Qty: 1 KIT | Refills: 0 | Status: ON HOLD | COMMUNITY
Start: 2024-04-15

## 2024-08-08 RX ORDER — VALSARTAN 160 MG/1
TAKE ONE TABLET BY MOUTH ONE TIME DAILY TABLET ORAL
Qty: 90 UNSPECIFIED | Refills: 2 | Status: ACTIVE | COMMUNITY

## 2024-08-08 RX ORDER — CHOLESTYRAMINE 4 G/9G
1 PACKET MIXED WITH WATER OR NON-CARBONATED DRINK POWDER, FOR SUSPENSION ORAL ONCE A DAY
Qty: 30 | Refills: 2 | OUTPATIENT
Start: 2024-08-08

## 2024-08-08 RX ORDER — LAMOTRIGINE 150 MG/1
1 TABLET TABLET ORAL TWICE A DAY
Status: ACTIVE | COMMUNITY

## 2024-08-08 RX ORDER — VANCOMYCIN HYDROCHLORIDE 125 MG/1
TAKE ONE CAPSULE BY MOUTH FOUR TIMES A DAY FOR 14 DAYS, THEN TAKE ONE CAPSULE BY MOUTH TWICE A DAY FOR 7 DAYS, THEN TAKE ONE CAPSULE BY MOUT CAPSULE ORAL
Qty: 80 UNSPECIFIED | Refills: 0 | Status: ON HOLD | COMMUNITY

## 2024-08-08 RX ORDER — ARIPIPRAZOLE 5 MG/1
1 TABLET TABLET ORAL ONCE A DAY
Status: ON HOLD | COMMUNITY

## 2024-08-08 RX ORDER — DEXTROAMPHETAMINE SACCHARATE, AMPHETAMINE ASPARTATE, DEXTROAMPHETAMINE SULFATE, AND AMPHETAMINE SULFATE 2.5; 2.5; 2.5; 2.5 MG/1; MG/1; MG/1; MG/1
1 TABLET TABLET ORAL THREE TIMES A DAY
Status: ACTIVE | COMMUNITY

## 2024-08-08 RX ORDER — SERTRALINE HYDROCHLORIDE 100 MG/1
TABLET, FILM COATED ORAL
Qty: 30 TABLET | Status: ACTIVE | COMMUNITY

## 2024-08-08 NOTE — EXAM-FUNCTIONAL ASSESSMENT
General--no acute distress, resting comfortably Eyes--anicteric, no pallor HENT--normocephalic, atraumatic head Neck--no lymphadenopathy, symmetric Chest--non labored breathing, equal rise Abdomen--soft, non tender, non distended, no organomegaly Ext: SELWYN, no obvious sores or rashes

## 2024-08-08 NOTE — HPI-TODAY'S VISIT:
Patient is a 73-year-old female seen today for diarrhea and nausea.  Patient was last seen by Dr. Landin on 6/26/2024 after and EGD for anemia and dyspepsia performed on 6/18/2024.  Patient did have retained food in the stomach so evaluation was limited.  Last week patient was on a cruise - she got diarrhea, nausea, cramps, and headache, Went to sick [place], and was diagnosed with gastroeneteritis. She was given loperamide and on zofran. Patient has a history of c diff, and has recently been on antibiotics for tooth abcess.

## 2024-08-14 ENCOUNTER — TELEPHONE ENCOUNTER (OUTPATIENT)
Dept: URBAN - METROPOLITAN AREA CLINIC 72 | Facility: CLINIC | Age: 74
End: 2024-08-14

## 2024-08-14 RX ORDER — FIDAXOMICIN 200 MG/1
1 TABLET TABLET, FILM COATED ORAL TWICE A DAY
Qty: 20 | Refills: 0 | OUTPATIENT
Start: 2024-08-14 | End: 2024-08-24

## 2024-08-15 ENCOUNTER — TELEPHONE ENCOUNTER (OUTPATIENT)
Dept: URBAN - METROPOLITAN AREA CLINIC 72 | Facility: CLINIC | Age: 74
End: 2024-08-15

## 2024-08-15 RX ORDER — VANCOMYCIN HYDROCHLORIDE 250 MG/1
1 CAPSULE 4 TIMES A DAY FOR 14 DAYS, 1 CAPSULE 3 TIMES A DAY FOR 7 DAYS, 1 CAPSULE 2 TIMES A DAY FOR 7 DAYS, 1 CAPSULE ONCE A DAY FOR 30 DAYS CAPSULE ORAL
Qty: 121 CAPSULE | Refills: 1 | OUTPATIENT
Start: 2024-08-16 | End: 2024-12-09

## 2024-09-05 ENCOUNTER — OFFICE VISIT (OUTPATIENT)
Dept: URBAN - METROPOLITAN AREA CLINIC 72 | Facility: CLINIC | Age: 74
End: 2024-09-05
Payer: MEDICARE

## 2024-09-05 VITALS
WEIGHT: 141.6 LBS | BODY MASS INDEX: 23.59 KG/M2 | DIASTOLIC BLOOD PRESSURE: 69 MMHG | HEART RATE: 98 BPM | TEMPERATURE: 97 F | SYSTOLIC BLOOD PRESSURE: 124 MMHG | HEIGHT: 65 IN

## 2024-09-05 DIAGNOSIS — Z86.19 HISTORY OF CLOSTRIDIOIDES DIFFICILE COLITIS: ICD-10-CM

## 2024-09-05 PROCEDURE — 99214 OFFICE O/P EST MOD 30 MIN: CPT

## 2024-09-05 RX ORDER — SERTRALINE HYDROCHLORIDE 100 MG/1
TABLET, FILM COATED ORAL
Qty: 30 TABLET | Status: ACTIVE | COMMUNITY

## 2024-09-05 RX ORDER — VANCOMYCIN HYDROCHLORIDE 250 MG/1
1 CAPSULE 4 TIMES A DAY FOR 14 DAYS, 1 CAPSULE 3 TIMES A DAY FOR 7 DAYS, 1 CAPSULE 2 TIMES A DAY FOR 7 DAYS, 1 CAPSULE ONCE A DAY FOR 30 DAYS CAPSULE ORAL
Qty: 121 CAPSULE | Refills: 1 | Status: ACTIVE | COMMUNITY
Start: 2024-08-16 | End: 2024-12-09

## 2024-09-05 RX ORDER — VALSARTAN 160 MG/1
TAKE ONE TABLET BY MOUTH ONE TIME DAILY TABLET ORAL
Qty: 90 UNSPECIFIED | Refills: 2 | Status: ACTIVE | COMMUNITY

## 2024-09-05 RX ORDER — VANCOMYCIN HYDROCHLORIDE 125 MG/1
TAKE ONE CAPSULE BY MOUTH FOUR TIMES A DAY FOR 14 DAYS, THEN TAKE ONE CAPSULE BY MOUTH TWICE A DAY FOR 7 DAYS, THEN TAKE ONE CAPSULE BY MOUT CAPSULE ORAL
Qty: 80 UNSPECIFIED | Refills: 0 | Status: ON HOLD | COMMUNITY

## 2024-09-05 RX ORDER — SODIUM SULFATE, MAGNESIUM SULFATE, AND POTASSIUM CHLORIDE 17.75; 2.7; 2.25 G/1; G/1; G/1
AS DIRECTED TABLET ORAL
Qty: 1 KIT | Refills: 0 | Status: ON HOLD | COMMUNITY
Start: 2024-04-15

## 2024-09-05 RX ORDER — DEXTROAMPHETAMINE SACCHARATE, AMPHETAMINE ASPARTATE, DEXTROAMPHETAMINE SULFATE, AND AMPHETAMINE SULFATE 2.5; 2.5; 2.5; 2.5 MG/1; MG/1; MG/1; MG/1
1 TABLET TABLET ORAL THREE TIMES A DAY
Status: ACTIVE | COMMUNITY

## 2024-09-05 RX ORDER — CHOLESTYRAMINE 4 G/9G
1 PACKET MIXED WITH WATER OR NON-CARBONATED DRINK POWDER, FOR SUSPENSION ORAL ONCE A DAY
Qty: 30 | Refills: 2 | Status: ACTIVE | COMMUNITY
Start: 2024-08-08

## 2024-09-05 RX ORDER — ARIPIPRAZOLE 5 MG/1
1 TABLET TABLET ORAL ONCE A DAY
Status: ON HOLD | COMMUNITY

## 2024-09-05 RX ORDER — LAMOTRIGINE 150 MG/1
1 TABLET TABLET ORAL TWICE A DAY
Status: ACTIVE | COMMUNITY

## 2024-09-05 NOTE — HPI-TODAY'S VISIT:
Patient is a 73-year-old female last seen in the office on 8/8/2024 for acute diarrhea which tested positive for C. difficile.  Patient has a history of C. difficile.  Dificid was prescribed, but the out-of-pocket co-pay was too expensive for the patient so she was put on a long taper of vancomycin, and maintained on cholestyramine daily.  Patient had a colonoscopy scheduled prior to C. difficile diagnosis, but it is unclear whether or not the patient is still scheduled.  Patient should be scheduled at end of day.  Patient is seen today without complaints of diarrhea, but states that the stool is still not great. The stools are somewhat formed, but differ in diameter, and are still soft and mushy. She is going 2 times daily - incomplete evacuation. She is on probiotic, but stopped cholestyramine. Her stools were more formed with the cholestyramine - will She has completed 2 weeks of 4 pills daily, and on Monday she started 3 pills a day. She is still scheduled for her colonoscopy on 9/19/24. Denies abd pain.

## 2024-09-19 ENCOUNTER — OFFICE VISIT (OUTPATIENT)
Dept: URBAN - METROPOLITAN AREA MEDICAL CENTER 40 | Facility: MEDICAL CENTER | Age: 74
End: 2024-09-19
Payer: MEDICARE

## 2024-09-19 DIAGNOSIS — D50.9 IRON DEFICIENCY ANEMIA, UNSPECIFIED IRON DEFICIENCY ANEMIA TYPE: ICD-10-CM

## 2024-09-19 DIAGNOSIS — K29.60 OTHER GASTRITIS WITHOUT BLEEDING: ICD-10-CM

## 2024-09-19 DIAGNOSIS — K31.A15 GASTRIC INTESTINAL METAPLASIA WITHOUT DYSPLASIA, INVOLVING MULTIPLE SITES: ICD-10-CM

## 2024-09-19 DIAGNOSIS — K22.10 EROSIVE ESOPHAGITIS: ICD-10-CM

## 2024-09-19 DIAGNOSIS — D12.0 ADENOMA OF CECUM: ICD-10-CM

## 2024-09-19 DIAGNOSIS — R19.4 CHANGE IN BOWEL HABIT: ICD-10-CM

## 2024-09-19 PROCEDURE — 43239 EGD BIOPSY SINGLE/MULTIPLE: CPT | Performed by: INTERNAL MEDICINE

## 2024-09-19 PROCEDURE — 45385 COLONOSCOPY W/LESION REMOVAL: CPT | Performed by: INTERNAL MEDICINE

## 2024-09-19 PROCEDURE — 45380 COLONOSCOPY AND BIOPSY: CPT | Performed by: INTERNAL MEDICINE

## 2024-10-02 PROBLEM — 423590009: Status: ACTIVE | Noted: 2024-10-02

## 2024-10-02 PROBLEM — 84568007: Status: ACTIVE | Noted: 2024-10-02

## 2024-10-02 PROBLEM — 428283002: Status: ACTIVE | Noted: 2024-10-02

## 2024-10-03 ENCOUNTER — OFFICE VISIT (OUTPATIENT)
Dept: URBAN - METROPOLITAN AREA CLINIC 72 | Facility: CLINIC | Age: 74
End: 2024-10-03
Payer: MEDICARE

## 2024-10-03 VITALS — HEIGHT: 65 IN | TEMPERATURE: 97.3 F | WEIGHT: 134.6 LBS | HEART RATE: 104 BPM | BODY MASS INDEX: 22.42 KG/M2

## 2024-10-03 DIAGNOSIS — Z86.0101 PERSONAL HISTORY OF ADENOMATOUS AND SERRATED COLON POLYPS: ICD-10-CM

## 2024-10-03 DIAGNOSIS — A04.72 C. DIFFICILE COLITIS: ICD-10-CM

## 2024-10-03 DIAGNOSIS — K29.40 ATROPHIC GASTRITIS, PRESENCE OF BLEEDING UNSPECIFIED: ICD-10-CM

## 2024-10-03 PROCEDURE — 99214 OFFICE O/P EST MOD 30 MIN: CPT

## 2024-10-03 RX ORDER — LAMOTRIGINE 150 MG/1
1 TABLET TABLET ORAL TWICE A DAY
Status: ACTIVE | COMMUNITY

## 2024-10-03 RX ORDER — CHOLESTYRAMINE 4 G/9G
1 PACKET MIXED WITH WATER OR NON-CARBONATED DRINK POWDER, FOR SUSPENSION ORAL ONCE A DAY
Qty: 30 | Refills: 2 | Status: ACTIVE | COMMUNITY
Start: 2024-08-08

## 2024-10-03 RX ORDER — ARIPIPRAZOLE 5 MG/1
1 TABLET TABLET ORAL ONCE A DAY
Status: ON HOLD | COMMUNITY

## 2024-10-03 RX ORDER — SODIUM SULFATE, MAGNESIUM SULFATE, AND POTASSIUM CHLORIDE 17.75; 2.7; 2.25 G/1; G/1; G/1
AS DIRECTED TABLET ORAL
Qty: 1 KIT | Refills: 0 | Status: ON HOLD | COMMUNITY
Start: 2024-04-15

## 2024-10-03 RX ORDER — VALSARTAN 160 MG/1
TAKE ONE TABLET BY MOUTH ONE TIME DAILY TABLET ORAL
Qty: 90 UNSPECIFIED | Refills: 2 | Status: ACTIVE | COMMUNITY

## 2024-10-03 RX ORDER — DEXTROAMPHETAMINE SACCHARATE, AMPHETAMINE ASPARTATE, DEXTROAMPHETAMINE SULFATE, AND AMPHETAMINE SULFATE 2.5; 2.5; 2.5; 2.5 MG/1; MG/1; MG/1; MG/1
1 TABLET TABLET ORAL THREE TIMES A DAY
Status: ACTIVE | COMMUNITY

## 2024-10-03 RX ORDER — LANSOPRAZOLE 30 MG/1
1 CAPSULE CAPSULE, DELAYED RELEASE ORAL ONCE A DAY
Qty: 90 CAPSULE | Refills: 0 | OUTPATIENT
Start: 2024-10-03

## 2024-10-03 RX ORDER — SERTRALINE HYDROCHLORIDE 100 MG/1
TABLET, FILM COATED ORAL
Qty: 30 TABLET | Status: ACTIVE | COMMUNITY

## 2024-10-03 RX ORDER — VANCOMYCIN HYDROCHLORIDE 125 MG/1
1 CAPSULE CAPSULE ORAL ONCE A DAY
Qty: 90 CAPSULE | Refills: 0 | OUTPATIENT
Start: 2024-10-03 | End: 2024-12-31

## 2024-10-03 RX ORDER — VANCOMYCIN HYDROCHLORIDE 125 MG/1
TAKE ONE CAPSULE BY MOUTH FOUR TIMES A DAY FOR 14 DAYS, THEN TAKE ONE CAPSULE BY MOUTH TWICE A DAY FOR 7 DAYS, THEN TAKE ONE CAPSULE BY MOUT CAPSULE ORAL
Qty: 80 UNSPECIFIED | Refills: 0 | Status: ON HOLD | COMMUNITY

## 2024-10-03 RX ORDER — VANCOMYCIN HYDROCHLORIDE 250 MG/1
1 CAPSULE CAPSULE ORAL DAILY
Qty: 58 CAPSULE | Refills: 1 | Status: ACTIVE | COMMUNITY
Start: 2024-08-16 | End: 2024-12-09

## 2024-10-03 NOTE — HPI-TODAY'S VISIT:
Patient is a 74-year-old female complicated history of C. difficile.  Patient should be considered for long-term treatment of think 125 mg daily.  Patient is here today for 2-week post colonoscopy visit.  Patient is on vancomycin 125 mg daily due to c diff histroy. We could try and retest for c diff and prescribe Dificid  - go through [employer] for coverage.   Patient is losing weight with no change in her appetite. She has lost 5 lbs in 3 months. Having 1-2 BM's daily soemtimes formed, but soft with lots of gas.  Wondering if this could be related to atrophic gastritis. Not on PPI - could start one. Drinks wine nightly. Patient has done some research and she is worries she will get stomach cancer - she used to be a drug rep for oncology and has states that she does not want any sort of treatment.   Procedure: EGD-9/19/2024: Duodenum normal.  Stomach normal.  Gastric erythema biopsies taken.  Esophagus normal.  Z-line noted to be at 35 cm from incisors and irregular.  Path: Moderate chronic inactive gastritis with intestinal metaplasia and findings compatible with severe oxyntic atrophy which could indicate autoimmune metaplastic atrophic gastritis.  Serum gastrin levels and serology antiparietal cell and/or antiintrinsic factor antibodies should be drawn.  Colonoscopy-9/19/2024: Perianal digital rectal examinations were normal.  Terminal ileum appeared normal.  5 mm sessile cecal polyp.  No evidence of pseudomembranes.  Random colon biopsies done.  Path: Cecal polyp was a sessile serrated polyp.  Random biopsies negative. Colon recall 5 years.  Due 9/2029.

## 2024-10-10 LAB
ANTIPARIETAL CELL ANTIBODY: 101.9
GASTRIN, SERUM: 3933
INTRINSIC FACTOR BLOCKING: POSITIVE

## 2024-10-16 ENCOUNTER — OFFICE VISIT (OUTPATIENT)
Dept: URBAN - METROPOLITAN AREA CLINIC 72 | Facility: CLINIC | Age: 74
End: 2024-10-16

## 2024-10-23 ENCOUNTER — OFFICE VISIT (OUTPATIENT)
Dept: URBAN - METROPOLITAN AREA CLINIC 72 | Facility: CLINIC | Age: 74
End: 2024-10-23

## 2024-12-10 ENCOUNTER — OFFICE VISIT (OUTPATIENT)
Dept: URBAN - METROPOLITAN AREA CLINIC 72 | Facility: CLINIC | Age: 74
End: 2024-12-10
Payer: MEDICARE

## 2024-12-10 ENCOUNTER — LAB OUTSIDE AN ENCOUNTER (OUTPATIENT)
Dept: URBAN - METROPOLITAN AREA CLINIC 72 | Facility: CLINIC | Age: 74
End: 2024-12-10

## 2024-12-10 VITALS
BODY MASS INDEX: 23.96 KG/M2 | HEIGHT: 65 IN | TEMPERATURE: 97.8 F | SYSTOLIC BLOOD PRESSURE: 142 MMHG | DIASTOLIC BLOOD PRESSURE: 70 MMHG | HEART RATE: 97 BPM | WEIGHT: 143.8 LBS

## 2024-12-10 DIAGNOSIS — R14.0 ABDOMINAL BLOATING: ICD-10-CM

## 2024-12-10 DIAGNOSIS — A04.72 CLOSTRIDIUM DIFFICILE DIARRHEA: ICD-10-CM

## 2024-12-10 DIAGNOSIS — K31.89 INTESTINAL METAPLASIA OF GASTRIC MUCOSA: ICD-10-CM

## 2024-12-10 PROCEDURE — 99214 OFFICE O/P EST MOD 30 MIN: CPT | Performed by: INTERNAL MEDICINE

## 2024-12-10 RX ORDER — LANSOPRAZOLE 30 MG/1
1 CAPSULE CAPSULE, DELAYED RELEASE ORAL ONCE A DAY
Qty: 90 CAPSULE | Refills: 0 | Status: ACTIVE | COMMUNITY
Start: 2024-10-03

## 2024-12-10 RX ORDER — VALSARTAN 160 MG/1
TAKE ONE TABLET BY MOUTH ONE TIME DAILY TABLET ORAL
Qty: 90 UNSPECIFIED | Refills: 2 | Status: ACTIVE | COMMUNITY

## 2024-12-10 RX ORDER — VANCOMYCIN HYDROCHLORIDE 125 MG/1
TAKE ONE CAPSULE BY MOUTH FOUR TIMES A DAY FOR 14 DAYS, THEN TAKE ONE CAPSULE BY MOUTH TWICE A DAY FOR 7 DAYS, THEN TAKE ONE CAPSULE BY MOUT CAPSULE ORAL
Qty: 80 UNSPECIFIED | Refills: 0 | Status: ON HOLD | COMMUNITY

## 2024-12-10 RX ORDER — DEXTROAMPHETAMINE SACCHARATE, AMPHETAMINE ASPARTATE, DEXTROAMPHETAMINE SULFATE, AND AMPHETAMINE SULFATE 2.5; 2.5; 2.5; 2.5 MG/1; MG/1; MG/1; MG/1
1 TABLET TABLET ORAL THREE TIMES A DAY
Status: ACTIVE | COMMUNITY

## 2024-12-10 RX ORDER — ARIPIPRAZOLE 5 MG/1
1 TABLET TABLET ORAL ONCE A DAY
Status: ON HOLD | COMMUNITY

## 2024-12-10 RX ORDER — LAMOTRIGINE 150 MG/1
1 TABLET TABLET ORAL TWICE A DAY
Status: ACTIVE | COMMUNITY

## 2024-12-10 RX ORDER — CHOLESTYRAMINE 4 G/9G
1 PACKET MIXED WITH WATER OR NON-CARBONATED DRINK POWDER, FOR SUSPENSION ORAL ONCE A DAY
Qty: 30 | Refills: 2 | Status: ACTIVE | COMMUNITY
Start: 2024-08-08

## 2024-12-10 RX ORDER — VANCOMYCIN HYDROCHLORIDE 125 MG/1
1 CAPSULE CAPSULE ORAL ONCE A DAY
Qty: 90 CAPSULE | Refills: 0 | Status: ACTIVE | COMMUNITY
Start: 2024-10-03 | End: 2024-12-31

## 2024-12-10 RX ORDER — SODIUM SULFATE, MAGNESIUM SULFATE, AND POTASSIUM CHLORIDE 17.75; 2.7; 2.25 G/1; G/1; G/1
AS DIRECTED TABLET ORAL
Qty: 1 KIT | Refills: 0 | Status: ON HOLD | COMMUNITY
Start: 2024-04-15

## 2024-12-10 RX ORDER — SERTRALINE HYDROCHLORIDE 100 MG/1
TABLET, FILM COATED ORAL
Qty: 30 TABLET | Status: ACTIVE | COMMUNITY

## 2024-12-10 NOTE — HPI-TODAY'S VISIT:
Mrs. Bauman returns for follow-up.  Recall she is a 74-year-old last seen in office on 10/3/2024.  She has history of atrophic gastritis, personal history of colon polyps and C. difficile colitis on vancomycin.  She had a colonoscopy in September that showed metaplastic atrophic gastritis and a colon polyp due for repeat colonoscopy in 5 years.  Lab work including gastrin, antiparietal cell and intrinsic factor labs were ordered she was started on PPI.  She has been on vancomycin 125 mg daily.  There is no evidence of pseudomembranes on her colonoscopy.  8/8/2024 stool BX kit was positive for C. difficile with EGD a positive and C. difficile toxin AB+, fecal calprotectin was 513. 10/4/2024 gastro level 3933, antiparietal cell antibody elevated at 101.9, intrinsic factor antibody positive.  She reports longstanding history of pernicious anemia, has been on B12 injections in the past. Feels well today.  Still having softer stool, and has not returned to normal.  She is on vancomycin daily.

## 2024-12-30 ENCOUNTER — TELEPHONE ENCOUNTER (OUTPATIENT)
Dept: URBAN - METROPOLITAN AREA CLINIC 107 | Facility: CLINIC | Age: 74
End: 2024-12-30

## 2025-01-13 ENCOUNTER — TELEPHONE ENCOUNTER (OUTPATIENT)
Dept: URBAN - METROPOLITAN AREA CLINIC 107 | Facility: CLINIC | Age: 75
End: 2025-01-13

## 2025-01-14 ENCOUNTER — TELEPHONE ENCOUNTER (OUTPATIENT)
Dept: URBAN - METROPOLITAN AREA CLINIC 72 | Facility: CLINIC | Age: 75
End: 2025-01-14

## 2025-01-17 ENCOUNTER — TELEPHONE ENCOUNTER (OUTPATIENT)
Dept: URBAN - METROPOLITAN AREA CLINIC 72 | Facility: CLINIC | Age: 75
End: 2025-01-17

## 2025-01-17 RX ORDER — VANCOMYCIN HYDROCHLORIDE 250 MG/1
1 CAPSULE CAPSULE ORAL
Qty: 109 | Refills: 0 | OUTPATIENT
Start: 2025-01-17

## 2025-01-17 RX ORDER — VANCOMYCIN HYDROCHLORIDE 125 MG/1
1 CAPSULE CAPSULE ORAL
Qty: 70 | Refills: 0 | OUTPATIENT
Start: 2025-01-17

## 2025-01-29 PROBLEM — 789685005: Status: ACTIVE | Noted: 2025-01-29

## 2025-01-30 ENCOUNTER — OFFICE VISIT (OUTPATIENT)
Dept: URBAN - METROPOLITAN AREA CLINIC 72 | Facility: CLINIC | Age: 75
End: 2025-01-30
Payer: MEDICARE

## 2025-01-30 VITALS
DIASTOLIC BLOOD PRESSURE: 70 MMHG | WEIGHT: 142.2 LBS | BODY MASS INDEX: 23.69 KG/M2 | HEART RATE: 91 BPM | SYSTOLIC BLOOD PRESSURE: 124 MMHG | HEIGHT: 65 IN | TEMPERATURE: 97.9 F

## 2025-01-30 DIAGNOSIS — A49.8 RECURRENT CLOSTRIDIOIDES DIFFICILE INFECTION: ICD-10-CM

## 2025-01-30 PROCEDURE — 99214 OFFICE O/P EST MOD 30 MIN: CPT

## 2025-01-30 PROCEDURE — 99213 OFFICE O/P EST LOW 20 MIN: CPT

## 2025-01-30 RX ORDER — LANSOPRAZOLE 30 MG/1
1 CAPSULE CAPSULE, DELAYED RELEASE ORAL ONCE A DAY
Qty: 90 CAPSULE | Refills: 0 | Status: ACTIVE | COMMUNITY
Start: 2024-10-03

## 2025-01-30 RX ORDER — SODIUM SULFATE, MAGNESIUM SULFATE, AND POTASSIUM CHLORIDE 17.75; 2.7; 2.25 G/1; G/1; G/1
AS DIRECTED TABLET ORAL
Qty: 1 KIT | Refills: 0 | Status: ON HOLD | COMMUNITY
Start: 2024-04-15

## 2025-01-30 RX ORDER — LAMOTRIGINE 150 MG/1
1 TABLET TABLET ORAL TWICE A DAY
Status: ACTIVE | COMMUNITY

## 2025-01-30 RX ORDER — VALSARTAN 160 MG/1
TAKE ONE TABLET BY MOUTH ONE TIME DAILY TABLET ORAL
Qty: 90 UNSPECIFIED | Refills: 2 | Status: ACTIVE | COMMUNITY

## 2025-01-30 RX ORDER — VANCOMYCIN HYDROCHLORIDE 125 MG/1
1 CAPSULE CAPSULE ORAL
Qty: 70 | Refills: 0 | Status: ON HOLD | COMMUNITY
Start: 2025-01-17

## 2025-01-30 RX ORDER — ARIPIPRAZOLE 5 MG/1
1 TABLET TABLET ORAL ONCE A DAY
Status: ON HOLD | COMMUNITY

## 2025-01-30 RX ORDER — DEXTROAMPHETAMINE SACCHARATE, AMPHETAMINE ASPARTATE, DEXTROAMPHETAMINE SULFATE, AND AMPHETAMINE SULFATE 2.5; 2.5; 2.5; 2.5 MG/1; MG/1; MG/1; MG/1
1 TABLET TABLET ORAL THREE TIMES A DAY
Status: ACTIVE | COMMUNITY

## 2025-01-30 RX ORDER — CHOLESTYRAMINE 4 G/9G
1 PACKET MIXED WITH WATER OR NON-CARBONATED DRINK POWDER, FOR SUSPENSION ORAL ONCE A DAY
Qty: 30 | Refills: 2 | Status: ACTIVE | COMMUNITY
Start: 2024-08-08

## 2025-01-30 RX ORDER — VANCOMYCIN HYDROCHLORIDE 250 MG/1
1 CAPSULE CAPSULE ORAL
Qty: 109 | Refills: 0 | Status: ACTIVE | COMMUNITY
Start: 2025-01-17

## 2025-01-30 RX ORDER — SERTRALINE HYDROCHLORIDE 100 MG/1
TABLET, FILM COATED ORAL
Qty: 30 TABLET | Status: ACTIVE | COMMUNITY

## 2025-01-30 RX ORDER — VANCOMYCIN HYDROCHLORIDE 125 MG/1
TAKE ONE CAPSULE BY MOUTH FOUR TIMES A DAY FOR 14 DAYS, THEN TAKE ONE CAPSULE BY MOUTH TWICE A DAY FOR 7 DAYS, THEN TAKE ONE CAPSULE BY MOUT CAPSULE ORAL
Qty: 80 UNSPECIFIED | Refills: 0 | Status: ON HOLD | COMMUNITY

## 2025-01-30 NOTE — HPI-TODAY'S VISIT:
Patient is a 74-year-old female with recurrent C. difficile.  She was started on a vancomycin long taper on January 17, 2025 for positive stool sample., at Dr. Nunez's suggestion.  She is here for follow-up.  Patient is seen today and states that her diarrhea has not stopped, but has slowed down. Her bowels are not formed yet. She is still on vanc QID and has a week of that left. Dr Nunez suggested Rybiota if this does work.

## 2025-01-30 NOTE — HPI-OTHER HISTORIES
Procedures: 9/2024 -Colon/EGD: metaplastic atrophic gastritis and a colon polyp due for repeat colonoscopy in 5 years. Recall 9/2029.   Labs:  1/15/2025-stool studies: C. difficile positive, glutamate dehydrogenase antigen detected, C. difficile toxins AB detected.  Lactoferrin 81.52, calprotectin 573.9, pancreatic elastase 469.5 all other bacteria/viral negative.  12/17/2024-B12 306, folic acid 20  8/8/2024-C. difficile positive, glutamate dehydrogenase antigen detected, C. difficile toxins AB detected, calprotectin 513, all other bacteria/viruses negative.  DI: 12/14/2021-abdominal ultrasound-possible sludge within the gallbladder.  Gallbladder otherwise unremarkable.  Mild increased echotexture liver which may reflect hepatic steatosis.  12/8/2021-CT abdomen pelvis: Mild hepatic steatosis.  Possible gallstones/sludge.  Gastritis/duodenitis and small hiatal hernia.  Aortoiliac vascular calcifications and mild chronic constipation.  L5/S1 spondylolisthesis 10/4/2024 gastro level 3933, antiparietal cell antibody elevated at 101.9, intrinsic factor antibody positive.

## 2025-02-17 ENCOUNTER — TELEPHONE ENCOUNTER (OUTPATIENT)
Dept: URBAN - METROPOLITAN AREA CLINIC 107 | Facility: CLINIC | Age: 75
End: 2025-02-17

## 2025-02-18 PROBLEM — 5891000119102: Status: ACTIVE | Noted: 2025-02-18

## 2025-03-14 ENCOUNTER — TELEPHONE ENCOUNTER (OUTPATIENT)
Dept: URBAN - METROPOLITAN AREA CLINIC 107 | Facility: CLINIC | Age: 75
End: 2025-03-14

## 2025-04-07 ENCOUNTER — OFFICE VISIT (OUTPATIENT)
Dept: URBAN - METROPOLITAN AREA MEDICAL CENTER 40 | Facility: MEDICAL CENTER | Age: 75
End: 2025-04-07
Payer: MEDICARE

## 2025-04-07 DIAGNOSIS — K29.60 OTHER GASTRITIS WITHOUT BLEEDING: ICD-10-CM

## 2025-04-07 DIAGNOSIS — K31.A15 GASTRIC INTESTINAL METAPLASIA WITHOUT DYSPLASIA, INVOLVING MULTIPLE SITES: ICD-10-CM

## 2025-04-07 PROCEDURE — 43239 EGD BIOPSY SINGLE/MULTIPLE: CPT | Performed by: INTERNAL MEDICINE

## 2025-04-07 RX ORDER — CHOLESTYRAMINE 4 G/9G
1 PACKET MIXED WITH WATER OR NON-CARBONATED DRINK POWDER, FOR SUSPENSION ORAL ONCE A DAY
Qty: 30 | Refills: 2 | Status: ACTIVE | COMMUNITY
Start: 2024-08-08

## 2025-04-07 RX ORDER — ARIPIPRAZOLE 5 MG/1
1 TABLET TABLET ORAL ONCE A DAY
Status: ON HOLD | COMMUNITY

## 2025-04-07 RX ORDER — SERTRALINE HYDROCHLORIDE 100 MG/1
TABLET, FILM COATED ORAL
Qty: 30 TABLET | Status: ACTIVE | COMMUNITY

## 2025-04-07 RX ORDER — VANCOMYCIN HYDROCHLORIDE 125 MG/1
TAKE ONE CAPSULE BY MOUTH FOUR TIMES A DAY FOR 14 DAYS, THEN TAKE ONE CAPSULE BY MOUTH TWICE A DAY FOR 7 DAYS, THEN TAKE ONE CAPSULE BY MOUT CAPSULE ORAL
Qty: 80 UNSPECIFIED | Refills: 0 | Status: ON HOLD | COMMUNITY

## 2025-04-07 RX ORDER — VALSARTAN 160 MG/1
TAKE ONE TABLET BY MOUTH ONE TIME DAILY TABLET ORAL
Qty: 90 UNSPECIFIED | Refills: 2 | Status: ACTIVE | COMMUNITY

## 2025-04-07 RX ORDER — LAMOTRIGINE 150 MG/1
1 TABLET TABLET ORAL TWICE A DAY
Status: ACTIVE | COMMUNITY

## 2025-04-07 RX ORDER — VANCOMYCIN HYDROCHLORIDE 125 MG/1
1 CAPSULE CAPSULE ORAL
Qty: 70 | Refills: 0 | Status: ON HOLD | COMMUNITY
Start: 2025-01-17

## 2025-04-07 RX ORDER — DEXTROAMPHETAMINE SACCHARATE, AMPHETAMINE ASPARTATE, DEXTROAMPHETAMINE SULFATE, AND AMPHETAMINE SULFATE 2.5; 2.5; 2.5; 2.5 MG/1; MG/1; MG/1; MG/1
1 TABLET TABLET ORAL THREE TIMES A DAY
Status: ACTIVE | COMMUNITY

## 2025-04-07 RX ORDER — SODIUM SULFATE, MAGNESIUM SULFATE, AND POTASSIUM CHLORIDE 17.75; 2.7; 2.25 G/1; G/1; G/1
AS DIRECTED TABLET ORAL
Qty: 1 KIT | Refills: 0 | Status: ON HOLD | COMMUNITY
Start: 2024-04-15

## 2025-04-07 RX ORDER — LANSOPRAZOLE 30 MG/1
1 CAPSULE CAPSULE, DELAYED RELEASE ORAL ONCE A DAY
Qty: 90 CAPSULE | Refills: 0 | Status: ACTIVE | COMMUNITY
Start: 2024-10-03

## 2025-04-07 RX ORDER — VANCOMYCIN HYDROCHLORIDE 250 MG/1
1 CAPSULE CAPSULE ORAL
Qty: 109 | Refills: 0 | Status: ACTIVE | COMMUNITY
Start: 2025-01-17

## 2025-04-21 ENCOUNTER — OFFICE VISIT (OUTPATIENT)
Dept: URBAN - METROPOLITAN AREA CLINIC 72 | Facility: CLINIC | Age: 75
End: 2025-04-21

## 2025-04-23 ENCOUNTER — OFFICE VISIT (OUTPATIENT)
Dept: URBAN - METROPOLITAN AREA CLINIC 72 | Facility: CLINIC | Age: 75
End: 2025-04-23
Payer: MEDICARE

## 2025-04-23 DIAGNOSIS — A04.72 CLOSTRIDIOIDES DIFFICILE DIARRHEA: ICD-10-CM

## 2025-04-23 DIAGNOSIS — Z86.2 HISTORY OF PERNICIOUS ANEMIA: ICD-10-CM

## 2025-04-23 DIAGNOSIS — R10.13 DYSPEPSIA: ICD-10-CM

## 2025-04-23 DIAGNOSIS — K29.40 AUTOIMMUNE GASTRITIS: ICD-10-CM

## 2025-04-23 DIAGNOSIS — K31.89 INTESTINAL METAPLASIA OF GASTRIC MUCOSA: ICD-10-CM

## 2025-04-23 PROBLEM — 275538002: Status: ACTIVE | Noted: 2025-04-23

## 2025-04-23 PROBLEM — 84568007: Status: ACTIVE | Noted: 2025-04-23

## 2025-04-23 PROCEDURE — 99213 OFFICE O/P EST LOW 20 MIN: CPT | Performed by: INTERNAL MEDICINE

## 2025-04-23 RX ORDER — VANCOMYCIN HYDROCHLORIDE 125 MG/1
1 CAPSULE CAPSULE ORAL
Qty: 70 | Refills: 0 | Status: ON HOLD | COMMUNITY
Start: 2025-01-17

## 2025-04-23 RX ORDER — CHOLESTYRAMINE 4 G/9G
1 PACKET MIXED WITH WATER OR NON-CARBONATED DRINK POWDER, FOR SUSPENSION ORAL ONCE A DAY
Qty: 30 | Refills: 2 | Status: ACTIVE | COMMUNITY
Start: 2024-08-08

## 2025-04-23 RX ORDER — VANCOMYCIN HYDROCHLORIDE 125 MG/1
TAKE ONE CAPSULE BY MOUTH FOUR TIMES A DAY FOR 14 DAYS, THEN TAKE ONE CAPSULE BY MOUTH TWICE A DAY FOR 7 DAYS, THEN TAKE ONE CAPSULE BY MOUT CAPSULE ORAL
Qty: 80 UNSPECIFIED | Refills: 0 | Status: ON HOLD | COMMUNITY

## 2025-04-23 RX ORDER — SERTRALINE HYDROCHLORIDE 100 MG/1
TABLET, FILM COATED ORAL
Qty: 30 TABLET | Status: ACTIVE | COMMUNITY

## 2025-04-23 RX ORDER — DEXTROAMPHETAMINE SACCHARATE, AMPHETAMINE ASPARTATE, DEXTROAMPHETAMINE SULFATE, AND AMPHETAMINE SULFATE 2.5; 2.5; 2.5; 2.5 MG/1; MG/1; MG/1; MG/1
1 TABLET TABLET ORAL AS NEEDED
Refills: 0 | Status: ACTIVE | COMMUNITY

## 2025-04-23 RX ORDER — LAMOTRIGINE 150 MG/1
1 TABLET TABLET ORAL TWICE A DAY
Status: ACTIVE | COMMUNITY

## 2025-04-23 RX ORDER — VANCOMYCIN HYDROCHLORIDE 250 MG/1
1 CAPSULE CAPSULE ORAL
Qty: 109 | Refills: 0 | Status: ACTIVE | COMMUNITY
Start: 2025-01-17

## 2025-04-23 RX ORDER — LANSOPRAZOLE 30 MG/1
1 CAPSULE CAPSULE, DELAYED RELEASE ORAL ONCE A DAY
Qty: 90 CAPSULE | Refills: 0 | Status: ON HOLD | COMMUNITY
Start: 2024-10-03

## 2025-04-23 RX ORDER — SODIUM SULFATE, MAGNESIUM SULFATE, AND POTASSIUM CHLORIDE 17.75; 2.7; 2.25 G/1; G/1; G/1
AS DIRECTED TABLET ORAL
Qty: 1 KIT | Refills: 0 | Status: ON HOLD | COMMUNITY
Start: 2024-04-15

## 2025-04-23 RX ORDER — VALSARTAN 320 MG/1
TAKE ONE TABLET BY MOUTH ONE TIME DAILY TABLET, FILM COATED ORAL ONCE A DAY
Refills: 2 | Status: ACTIVE | COMMUNITY

## 2025-04-23 RX ORDER — ARIPIPRAZOLE 5 MG/1
1 TABLET TABLET ORAL ONCE A DAY
Status: ON HOLD | COMMUNITY

## 2025-04-23 NOTE — EXAM-PHYSICAL EXAM
General- no acute distress, resting comfortably Eyes- anicteric, no pallor HENT- normocephalic, atraumatic head Neck- no lymphadenopathy, symmetric Chest- non labored breathing, equal rise Abdomen- soft, non tender, non distended, no organomegaly Ext: SELWYN, no obvious sores or rashes

## 2025-04-23 NOTE — HPI-TODAY'S VISIT:
Mrs. Bauman returns for follow-up.  Recall she is a 74-year-old last seen in office on 1/30/2025.  She has a history of C. difficile colitis requiring prolonged vancomycin taper, she has had metaplastic atrophic gastritis and has a history of pernicious anemia requiring B12 injections.  She underwent EGD for surveillance of her atrophic gastritis on 4/7/2025.  Procedures: 4/7/2025 EGD done for GI mapping showed normal esophagus, gastritis on the lesser curvature and body of the stomach moderately severe in nature normal small bowel. GI mapping pathology: Stomach antrum reactive gastropathy no intestinal aplasia, greater curvature body mild chronic inflammation no intestinal metaplasia, lesser curvature antral mucosa with mild chronic inflammation no intestinal metaplasia, incisura mild chronic inflammation no intestinal metaplasia, body diffuse intestinal and Paneth cell metaplasia no dysplasia, cardia chronic inflammation diffuse intestinal Paneth cell metaplasia no dysplasia, fundus diffuse intestinal metaplasia no dysplasia.  Repeat EGD recommended in 1 year. She is feeling well today.  She saw the infectious disease doctor who said she did not have C. difficile recommended fiber her bowels are now under good control.  She has no complaints today.  9/2024 -Colon/EGD: metaplastic atrophic gastritis and a colon polyp due for repeat colonoscopy in 5 years. Recall 9/2029.   Labs:  1/15/2025-stool studies: C. difficile positive, glutamate dehydrogenase antigen detected, C. difficile toxins AB detected. Lactoferrin 81.52, calprotectin 573.9, pancreatic elastase 469.5 all other bacteria/viral negative.  12/17/2024-B12 306, folic acid 20  10/4/2024 gastro level 3933, antiparietal cell antibody elevated at 101.9, intrinsic factor antibody positive.  8/8/2024-C. difficile positive, glutamate dehydrogenase antigen detected, C. difficile toxins AB detected, calprotectin 513, all other bacteria/viruses negative.